# Patient Record
Sex: FEMALE | Race: WHITE | URBAN - METROPOLITAN AREA
[De-identification: names, ages, dates, MRNs, and addresses within clinical notes are randomized per-mention and may not be internally consistent; named-entity substitution may affect disease eponyms.]

---

## 2018-10-17 ENCOUNTER — TRANSFERRED RECORDS (OUTPATIENT)
Dept: HEALTH INFORMATION MANAGEMENT | Facility: CLINIC | Age: 68
End: 2018-10-17

## 2018-10-22 ENCOUNTER — THERAPY VISIT (OUTPATIENT)
Dept: PHYSICAL THERAPY | Facility: CLINIC | Age: 68
End: 2018-10-22
Payer: OTHER MISCELLANEOUS

## 2018-10-22 DIAGNOSIS — M25.512 ACUTE PAIN OF LEFT SHOULDER: Primary | ICD-10-CM

## 2018-10-22 PROCEDURE — 97161 PT EVAL LOW COMPLEX 20 MIN: CPT | Mod: GP | Performed by: PHYSICAL THERAPIST

## 2018-10-22 PROCEDURE — 97140 MANUAL THERAPY 1/> REGIONS: CPT | Mod: GP | Performed by: PHYSICAL THERAPIST

## 2018-10-22 NOTE — PROGRESS NOTES
[facility] Initial Evaluation  Subjective:  Patient is a 68 year old female presenting with rehab left shoulder hpi. The history is provided by the patient. No  was used.   Liudmila Bonds is a 68 year old female with a left shoulder condition.  Condition occurred with:  A fall.  Condition occurred: at work.  This is a new condition  She was going up a small step at work when she caught her R foot on step and fell forward and she hit her L shoulder into the wall and her L knee into the ground on 10/17/18.  She had instant pain in her L shoulder and knee.  The pain has improved since the fall.  Now the pain is more in the L chest into L medial/anterior upper arm and some into her L palm.  She needs to move her arm slow to avoid pain.  She gets pinching pain with raising L arm too high.  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  She will have pain if she tries to do too much lifting with L arm (holding cell phone in L hand increases pain).  Feels better with keeping arm at side.  .    Patient reports pain:  Anterior.  Radiates to:  Elbow, shoulder and upper arm.  Pain is described as aching and sharp and is constant (sharp is intermittent) and reported as 4/10 (worst 10/10).  Associated symptoms:  Loss of motion/stiffness, loss of strength, catching and edema. Pain is the same all the time.  Symptoms are exacerbated by using arm behind back, certain positions, carrying, lifting, using arm at shoulder level and using arm overhead and relieved by rest (limiting activity).  Since onset symptoms are gradually improving.        General health as reported by patient is good.  Pertinent medical history includes:  Fibromyalgia, osteoarthritis, incontinence, migraines/headaches and overweight.  Medical allergies: no.  Other surgeries include:  Orthopedic surgery (scope B knees).  Current medications:  Anti-inflammatory and muscle relaxants.  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  Patient is currently not working due to present treatment problem.  Primary job tasks include:  Driving and prolonged sitting.    Barriers include:  None as reported by the patient.    Red flags:  None as reported by the patient.                        Objective:  System    Physical Exam    General     ROS  Liudmila Bonds , : 1950, MRN: [id number]    Physical Therapy Objective Findings  Subjective information, goals, clinical impression, daily documentation and other information found in EPISODES tab.  Shoulder Objective Findings  Posture Comments: L shoulder rolled forward, keeps L hand tight to her body, mod forward head  Screens:                                                                                            Right                                                        Left                          Cervical (ROM, quadrant) Flex 40  Ext 33  R SB 14  R rot 50 L SB 10 (pain shooting into upper back)  L rot 50    Thoracic Mobility 50% ++ 50% +   TOS (Mohamud jeff, Torie, Magno)              Range of Motion:                                             Right AROM          Right PROM            Left AROM             Left PROM           Flexion 133 165 80 90   Abduction 100 165 50 90   External Rotation 77 in 0 degrees wnl in 90 degrees 52 in 0 degrees  45 in 45 degrees   Internal Rotation T12 in 0 degrees wnl in 90 degrees Back pocket in 0 degrees 45 in 45 degrees   Other:         Scapulothoracic Rhythm: Early upward rotation L>R    Manual Muscle Testing (graded 0-5, measured at 0 degrees unless otherwise noted):                                                                                                  Right                                                    Left                             Flexion 5 (+) 5 (++)   Abduction 5 (++) 4 (+++)   External Rotation (at 0) 5 (+) 5 (+)   Internal Rotation (at 0) 5(+) 5 (+)   Extension     Mid  Trap     Lower Trap     Other:     (+ mild pain, ++ moderate pain, +++ severe pain)    Special Tests:                                                                                             Right                                                    Left                             NEER'S - +   Del Rio/Matteo - +   Coracoid - +   Bursa - -   Valentines's -    Load and Shift      Relocation test     Sulcus test  -   Crossover -    OTHER: full can + +     Flexibility:                                                                                                 Right                                                    Left                             Pec Minor (supine) Mild tightness Mod tightness   Other     Other       Joint Play                                                                                              Right                                                  Left                            Glenohumeral normal normal   ACJ normal normal   SCJ normal normal     Palpation:  Tender B supraspinatus tendons, hypertonicity L pec minor, tender L long head biceps    Assessment/Plan:    Patient is a 68 year old female with left side shoulder complaints.    Patient has the following significant findings with corresponding treatment plan.                Diagnosis 1:  L shoulder pain consistent with tendonitis of supraspinatus with long head biceps and pec minor irritation as well    Pain -  hot/cold therapy, manual therapy, self management, education and home program  Decreased ROM/flexibility - manual therapy, therapeutic exercise and home program  Decreased strength - therapeutic exercise, therapeutic activities and home program  Decreased function - therapeutic activities and home program  Impaired posture - neuro re-education and home program    Therapy Evaluation Codes:   1) History comprised of:   Personal factors that impact the plan of care:      Age.    Comorbidity factors that impact the plan of care  are:      Fibromyalgia, Osteoarthritis and Overweight.     Medications impacting care: Anti-inflammatory and Muscle relaxant.  2) Examination of Body Systems comprised of:   Body structures and functions that impact the plan of care:      Shoulder.   Activity limitations that impact the plan of care are:      Bathing, Cooking, Driving, Dressing, Lifting, Working and Sleeping.  3) Clinical presentation characteristics are:   Stable/Uncomplicated.  4) Decision-Making    Low complexity using standardized patient assessment instrument and/or measureable assessment of functional outcome.  Cumulative Therapy Evaluation is: Low complexity.    Previous and current functional limitations:  (See Goal Flow Sheet for this information)    Short term and Long term goals: (See Goal Flow Sheet for this information)     Communication ability:  Patient appears to be able to clearly communicate and understand verbal and written communication and follow directions correctly.  Treatment Explanation - The following has been discussed with the patient:   RX ordered/plan of care  Anticipated outcomes  Possible risks and side effects  This patient would benefit from PT intervention to resume normal activities.   Rehab potential is good.    Frequency:  2 X week, once daily, 2 weeks, then 1x/week, 2 weeks  Duration:  for 4 weeks  Discharge Plan:  Achieve all LTG.  Independent in home treatment program.  Reach maximal therapeutic benefit.    Please refer to the daily flowsheet for treatment today, total treatment time and time spent performing 1:1 timed codes.     Matty Resendiz,PT, DPT, OCS

## 2018-10-22 NOTE — MR AVS SNAPSHOT
After Visit Summary   10/22/2018    Liudmila Bonds    MRN: [id number]           Patient Information     Date Of Birth          1950        Visit Information        Provider Department      10/22/2018 9:00 AM Matty Resendiz, PT [facility]        Today's Diagnoses     Acute pain of left shoulder    -  1       Follow-ups after your visit        Your next 10 appointments already scheduled     Oct 25, 2018  9:00 AM CDT   MARSHA Extremity with Paty Humphrey, PT   [facility] ([facility]  )    [address]  [facility] [address]   [phone]            Oct 29, 2018  9:00 AM CDT   MARSHA Extremity with Matty Resendiz, PT   [facility] ([facility]  )    [address]  [facility] [address]   [phone]            Nov 02, 2018  9:00 AM CDT   MARSHA Extremity with Matty Resendiz, PT   [facility]  )    [address]  [facility] [address]   [phone]              Who to contact     If you have questions or need follow up information about today's clinic visit or your schedule please contact [facility]ERAP[facility] directly at [phone].  Normal or non-critical lab and imaging results will be communicated to you by MyChart, letter or phone within 4 business days after the clinic has received the results. If you do not hear from us within 7 days, please contact the clinic through MyChart or phone. If you have a critical or abnormal lab result, we will notify you by phone as soon as possible.  Submit refill requests through [facility] or call your pharmacy and they will forward the refill request to us. Please allow 3 business days for your refill to be completed.          Additional Information About Your  "Visit        [facility] Information     SNAPP' lets you send messages to your doctor, view your test results, renew your prescriptions, schedule appointments and more. To sign up, go to [email or web address] . Click on \"Log in\" on the left side of the screen, which will take you to the Welcome page. Then click on \"Sign up Now\" on the right side of the page.     You will be asked to enter the access code listed below, as well as some personal information. Please follow the directions to create your username and password.     Your access code is: [email or web address]  Expires: 2019  1:32 PM     Your access code will  in 90 days. If you need help or a new code, please call your [facility] clinic or [phone].        Care EveryWhere ID     This is your Care EveryWhere ID. This could be used by other organizations to access your [facility] medical records  [id number]         Blood Pressure from Last 3 Encounters:   08 140/85    Weight from Last 3 Encounters:   No data found for Wt              We Performed the Following     HC PT EVAL, LOW COMPLEXITY     MARSHA INITIAL EVAL REPORT     MANUAL THER TECH,1+REGIONS,EA 15 MIN        Primary Care Provider Office Phone # Fax #    [facility] [phone]       [facility] [address] [place] [address]        Equal Access to Services     BELL TOMLINSON : Hadii aad ku hadasho Soomaali, waaxda luqadaha, qaybta kaalmada burke, olman haywood . So [facility] [phone].    ATENCIÓN: Si habla español, tiene a randhawa disposición servicios gratuitos de asistencia lingüística. Mich al [phone].    We comply with applicable federal civil rights laws and [place] laws. We do not discriminate on the basis of race, color, national origin, age, disability, sex, sexual orientation, or gender identity.            Thank you!     Thank you for choosing [facility]  for your " care. Our goal is always to provide you with excellent care. Hearing back from our patients is one way we can continue to improve our services. Please take a few minutes to complete the written survey that you may receive in the mail after your visit with us. Thank you!             Your Updated Medication List - Protect others around you: Learn how to safely use, store and throw away your medicines at www.disposemymeds.org.          This list is accurate as of 10/22/18  1:32 PM.  Always use your most recent med list.                   Brand Name Dispense Instructions for use Diagnosis    doxycycline 100 MG capsule    VIBRAMYCIN    20    Take 1  tablet twice a day for 10 days    Acute sinusitis, unspecified, Bronchitis, not specified as acute or chronic       ROBITUSSIN A-C  MG/5ML Syrp   Generic drug:  CODEINE-GUAIFENESIN     4 OZ    ONE TO TWO TEASPOONS EVER 4 HOURS, AS NEEDED FOR COUGH    Bronchitis, not specified as acute or chronic

## 2018-10-22 NOTE — LETTER
[facility] PHYSICAL [facility]  [address]  [facility] [id number] [phone]    2018    Re: Liudmila Bonds   :   1950  MRN:  [id number]   REFERRING PHYSICIAN:   Jessi Hernadez     [facility]  Date of Initial Evaluation:  10/22/18  Visits:  Rxs Used: 1  Reason for Referral:  Acute pain of left shoulder    EVALUATION SUMMARY    [facility] Initial Evaluation  Subjective:  Patient is a 68 year old female presenting with rehab left shoulder hpi. The history is provided by the patient. No  was used.   Liudmila Bonds is a 68 year old female with a left shoulder condition.  Condition occurred with:  A fall.  Condition occurred: at work.  This is a new condition  She was going up a small step at work when she caught her R foot on step and fell forward and she hit her L shoulder into the wall and her L knee into the ground on 10/17/18.  She had instant pain in her L shoulder and knee.  The pain has improved since the fall.  Now the pain is more in the L chest into L medial/anterior upper arm and some into her L palm.  She needs to move her arm slow to avoid pain.  She gets pinching pain with raising L arm too high.  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  She will have pain if she tries to do too much lifting with L arm (holding cell phone in L hand increases pain).  Feels better with keeping arm at side.  .    Patient reports pain:  Anterior.  Radiates to:  Elbow, shoulder and upper arm.  Pain is described as aching and sharp and is constant (sharp is intermittent) and reported as 4/10 (worst 10/10).  Associated symptoms:  Loss of motion/stiffness, loss of strength, catching and edema. Pain is the same all the time.  Symptoms are exacerbated by using arm behind back, certain positions, carrying, lifting, using arm at shoulder level and  using arm overhead and relieved by rest (limiting activity).  Since onset symptoms are gradually improving.        General health as reported by patient is good.  Pertinent medical history includes:  Fibromyalgia, osteoarthritis, incontinence, migraines/headaches and overweight.  Medical allergies: no.  Other surgeries include:  Orthopedic surgery (scope B knees).  Current medications:  Anti-inflammatory and muscle relaxants.  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  Patient is currently not working due to present treatment problem.  Primary job tasks include:  Driving and prolonged sitting.    Barriers include:  None as reported by the patient.    Red flags:  None as reported by the patient.                        Objective:  Liudmila Bonds , : 1950, MRN: [id number]    Physical Therapy Objective Findings  Subjective information, goals, clinical impression, daily documentation and other information found in EPISODES tab.  Shoulder Objective Findings  Posture Comments: L shoulder rolled forward, keeps L hand tight to her body, mod forward head  Screens:                                                                                            Right                                                        Left                          Cervical (ROM, quadrant) Flex 40  Ext 33  R SB 14  R rot 50 L SB 10 (pain shooting into upper back)  L rot 50    Thoracic Mobility 50% ++ 50% +   TOS (Mohamud jeff, Torie, Magno)              Range of Motion:                                             Right AROM          Right PROM            Left AROM             Left PROM           Flexion 133 165 80 90   Abduction 100 165 50 90   External Rotation 77 in 0 degrees wnl in 90 degrees 52 in 0 degrees  45 in 45 degrees   Internal Rotation T12 in 0 degrees wnl in 90 degrees Back pocket in 0 degrees 45 in 45 degrees   Other:         Scapulothoracic Rhythm: Early upward rotation  L>R    Manual Muscle Testing (graded 0-5, measured at 0 degrees unless otherwise noted):                                                                                                  Right                                                    Left                             Flexion 5 (+) 5 (++)   Abduction 5 (++) 4 (+++)   External Rotation (at 0) 5 (+) 5 (+)   Internal Rotation (at 0) 5(+) 5 (+)   Extension     Mid Trap     Lower Trap     Other:     (+ mild pain, ++ moderate pain, +++ severe pain)    Special Tests:                                                                                             Right                                                    Left                             NEER'S - +   Del Rio/Matteo - +   Coracoid - +   Bursa - -   Laurens's -    Load and Shift      Relocation test     Sulcus test  -   Crossover -    OTHER: full can + +   Flexibility:                                                                                                 Right                                                    Left                             Pec Minor (supine) Mild tightness Mod tightness   Other     Other       Joint Play                                                                                              Right                                                  Left                            Glenohumeral normal normal   ACJ normal normal   SCJ normal normal     Palpation:  Tender B supraspinatus tendons, hypertonicity L pec minor, tender L long head biceps    Assessment/Plan:    Patient is a 68 year old female with left side shoulder complaints.    Patient has the following significant findings with corresponding treatment plan.                Diagnosis 1:  L shoulder pain consistent with tendonitis of supraspinatus with long head biceps and pec minor irritation as well  Pain -  hot/cold therapy, manual therapy, self management, education and home program  Decreased ROM/flexibility - manual  therapy, therapeutic exercise and home program  Decreased strength - therapeutic exercise, therapeutic activities and home program  Decreased function - therapeutic activities and home program  Impaired posture - neuro re-education and home program    Therapy Evaluation Codes:   1) History comprised of:   Personal factors that impact the plan of care:      Age.    Comorbidity factors that impact the plan of care are:      Fibromyalgia, Osteoarthritis and Overweight.     Medications impacting care: Anti-inflammatory and Muscle relaxant.  2) Examination of Body Systems comprised of:   Body structures and functions that impact the plan of care:      Shoulder.   Activity limitations that impact the plan of care are:      Bathing, Cooking, Driving, Dressing, Lifting, Working and Sleeping.  3) Clinical presentation characteristics are:   Stable/Uncomplicated.  4) Decision-Making    Low complexity using standardized patient assessment instrument and/or measureable assessment of functional outcome.  Cumulative Therapy Evaluation is: Low complexity.    Previous and current functional limitations:  (See Goal Flow Sheet for this information)    Short term and Long term goals: (See Goal Flow Sheet for this information)     Communication ability:  Patient appears to be able to clearly communicate and understand verbal and written communication and follow directions correctly.  Treatment Explanation - The following has been discussed with the patient:   RX ordered/plan of care  Anticipated outcomes  Possible risks and side effects  This patient would benefit from PT intervention to resume normal activities.   Rehab potential is good.    Frequency:  2 X week, once daily, 2 weeks, then 1x/week, 2 weeks  Duration:  for 4 weeks  Discharge Plan:  Achieve all LTG.  Independent in home treatment program.  Reach maximal therapeutic benefit.    Thank you for your referral.    INQUIRIES  Therapist: Matty Resendiz,PT, DPT,  [facility]      [facility]  [address]  [facility] [address]  Phone: [phone]  Fax: [phone]

## 2018-10-25 ENCOUNTER — THERAPY VISIT (OUTPATIENT)
Dept: PHYSICAL THERAPY | Facility: CLINIC | Age: 68
End: 2018-10-25
Payer: OTHER MISCELLANEOUS

## 2018-10-25 DIAGNOSIS — M25.512 ACUTE PAIN OF LEFT SHOULDER: ICD-10-CM

## 2018-10-25 PROCEDURE — 97110 THERAPEUTIC EXERCISES: CPT | Mod: GP | Performed by: PHYSICAL THERAPIST

## 2018-10-25 PROCEDURE — 97140 MANUAL THERAPY 1/> REGIONS: CPT | Mod: GP | Performed by: PHYSICAL THERAPIST

## 2018-10-29 ENCOUNTER — THERAPY VISIT (OUTPATIENT)
Dept: PHYSICAL THERAPY | Facility: CLINIC | Age: 68
End: 2018-10-29
Payer: OTHER MISCELLANEOUS

## 2018-10-29 DIAGNOSIS — M25.512 ACUTE PAIN OF LEFT SHOULDER: ICD-10-CM

## 2018-10-29 PROCEDURE — 97110 THERAPEUTIC EXERCISES: CPT | Mod: GP | Performed by: PHYSICAL THERAPIST

## 2018-10-29 PROCEDURE — 97112 NEUROMUSCULAR REEDUCATION: CPT | Mod: GP | Performed by: PHYSICAL THERAPIST

## 2018-10-29 PROCEDURE — 97140 MANUAL THERAPY 1/> REGIONS: CPT | Mod: GP | Performed by: PHYSICAL THERAPIST

## 2018-11-06 ENCOUNTER — THERAPY VISIT (OUTPATIENT)
Dept: PHYSICAL THERAPY | Facility: CLINIC | Age: 68
End: 2018-11-06
Payer: OTHER MISCELLANEOUS

## 2018-11-06 DIAGNOSIS — M25.512 ACUTE PAIN OF LEFT SHOULDER: ICD-10-CM

## 2018-11-06 PROCEDURE — 97112 NEUROMUSCULAR REEDUCATION: CPT | Mod: GP | Performed by: PHYSICAL THERAPIST

## 2018-11-06 PROCEDURE — 97140 MANUAL THERAPY 1/> REGIONS: CPT | Mod: GP | Performed by: PHYSICAL THERAPIST

## 2018-11-06 PROCEDURE — 97110 THERAPEUTIC EXERCISES: CPT | Mod: GP | Performed by: PHYSICAL THERAPIST

## 2018-11-13 ENCOUNTER — THERAPY VISIT (OUTPATIENT)
Dept: PHYSICAL THERAPY | Facility: CLINIC | Age: 68
End: 2018-11-13
Payer: OTHER MISCELLANEOUS

## 2018-11-13 DIAGNOSIS — M25.512 ACUTE PAIN OF LEFT SHOULDER: ICD-10-CM

## 2018-11-13 PROCEDURE — 97112 NEUROMUSCULAR REEDUCATION: CPT | Mod: GP | Performed by: PHYSICAL THERAPIST

## 2018-11-13 PROCEDURE — 97140 MANUAL THERAPY 1/> REGIONS: CPT | Mod: GP | Performed by: PHYSICAL THERAPIST

## 2018-11-13 PROCEDURE — 97110 THERAPEUTIC EXERCISES: CPT | Mod: GP | Performed by: PHYSICAL THERAPIST

## 2018-11-13 NOTE — PROGRESS NOTES
Subjective:  HPI                    Objective:  System    Physical Exam    General     ROS    Assessment/Plan:    PROGRESS  REPORT    Progress reporting period is from 10/22/18 to 11/13/18.       SUBJECTIVE  Subjective changes noted by patient:  doing well, she will have some pain with reaching into abduction, she has  returned to work full time, shoulder not limiting her sleep, she still has some pain with leaning on L arm, she hasn't been very good with the exercises in last week with returning to work full time    Current Pain level: 0/10 (worst 3-4/10).     Previous pain level was  NA Initial Pain level: 4/10.   Changes in function:  Yes (See Goal flowsheet attached for changes in current functional level)  Adverse reaction to treatment or activity: activity - pushing up with L arm, lifting too much with L arm    OBJECTIVE  Changes noted in objective findings:    Objective: AROM: flex 135, abd 100 (+), ER(0) 80, IR(0) T9, + full can L, + neers, - verma, + corocoid, - cross arm, + bursa, tender at L supraspinatus tendon, MMT: shoulder abd 4-/5 (++),  ER 4+/5 (+), IR 4+/5, biceps 4+/5 (+), posture: mod forward head, increased thoracic kyphosis, normal tone L pec minor     ASSESSMENT/PLAN  Updated problem list and treatment plan: Diagnosis 1:   L shoulder pain consistent with tendonitis of supraspinatus with long head biceps and pec minor irritation as well    Pain -  hot/cold therapy, manual therapy, self management, education and home program  Decreased ROM/flexibility - manual therapy, therapeutic exercise, therapeutic activity and home program  Decreased strength - therapeutic exercise, therapeutic activities and home program  Decreased function - therapeutic activities and home program  Impaired posture - neuro re-education, therapeutic activities and home program  STG/LTGs have been met or progress has been made towards goals:  Yes (See Goal flow sheet completed today.)  Assessment of Progress: The  patient's condition is improving.  Self Management Plans:  Patient has been instructed in a home treatment program.  I have re-evaluated this patient and find that the nature, scope, duration and intensity of the therapy is appropriate for the medical condition of the patient.  Liudmila continues to require the following intervention to meet STG and LTG's:  PT    Recommendations:  This patient would benefit from continued therapy.     Frequency:  1 X week, once daily  Duration:  for 4 weeks        Please refer to the daily flowsheet for treatment today, total treatment time and time spent performing 1:1 timed codes.    Matty Resendiz,PT, DPT, OCS

## 2018-11-13 NOTE — LETTER
[facility]  [address]  [facility] [id number] [phone]    2018    Re: Liudmila Bonds   :   1950  MRN:  [id number]   REFERRING PHYSICIAN:   Jessi Hernadez    [facility]  Date of Initial Evaluation:  10/22/18  Visits:  Rxs Used: 5  Reason for Referral:  Acute pain of left shoulder    PROGRESS  REPORT  Progress reporting period is from 10/22/18 to 18.       SUBJECTIVE  Subjective changes noted by patient:  doing well, she will have some pain with reaching into abduction, she has  returned to work full time, shoulder not limiting her sleep, she still has some pain with leaning on L arm, she hasn't been very good with the exercises in last week with returning to work full time    Current Pain level: 0/10 (worst 3-4/10).     Previous pain level was  NA Initial Pain level: 4/10.   Changes in function:  Yes (See Goal flowsheet attached for changes in current functional level)  Adverse reaction to treatment or activity: activity - pushing up with L arm, lifting too much with L arm    OBJECTIVE  Changes noted in objective findings:    Objective: AROM: flex 135, abd 100 (+), ER(0) 80, IR(0) T9, + full can L, + neers, - verma, + corocoid, - cross arm, + bursa, tender at L supraspinatus tendon, MMT: shoulder abd 4-/5 (++),  ER 4+/5 (+), IR 4+/5, biceps 4+/5 (+), posture: mod forward head, increased thoracic kyphosis, normal tone L pec minor     ASSESSMENT/PLAN  Updated problem list and treatment plan: Diagnosis 1:   L shoulder pain consistent with tendonitis of supraspinatus with long head biceps and pec minor irritation as well    Pain -  hot/cold therapy, manual therapy, self management, education and home program  Decreased ROM/flexibility - manual therapy, therapeutic exercise, therapeutic activity and home program  Decreased strength - therapeutic exercise,  therapeutic activities and home program  Decreased function - therapeutic activities and home program  Impaired posture - neuro re-education, therapeutic activities and home program  STG/LTGs have been met or progress has been made towards goals:  Yes (See Goal flow sheet completed today.)  Assessment of Progress: The patient's condition is improving.  Self Management Plans:  Patient has been instructed in a home treatment program.    I have re-evaluated this patient and find that the nature, scope, duration and intensity of the therapy is appropriate for the medical condition of the patient.  Liudmila continues to require the following intervention to meet STG and LTG's:  PT    Recommendations:  This patient would benefit from continued therapy.     Frequency:  1 X week, once daily  Duration:  for 4 weeks    Thank you for your referral.    INQUIRIES  Therapist:  Matty Resendiz,PT, DPT, \[facility]\""      [facility]  [address] [place] [address]. [address]  [facility] [address]  Phone: [phone]  Fax: [phone]

## 2018-11-13 NOTE — MR AVS SNAPSHOT
"              After Visit Summary   2018    Liudmila Bonds    MRN: [id number]           Patient Information     Date Of Birth          1950        Visit Information        Provider Department      2018 10:00 AM Matty Resendiz PT [facility]        Today's Diagnoses     Acute pain of left shoulder           Follow-ups after your visit        Who to contact     If you have questions or need follow up information about today's clinic visit or your schedule please contact [facility] ATHLETIC [facility] directly at [phone].  Normal or non-critical lab and imaging results will be communicated to you by [employer], letter or phone within 4 business days after the clinic has received the results. If you do not hear from us within 7 days, please contact the clinic through [employer] or phone. If you have a critical or abnormal lab result, we will notify you by phone as soon as possible.  Submit refill requests through [employer] or call your pharmacy and they will forward the refill request to us. Please allow 3 business days for your refill to be completed.          Additional Information About Your Visit        MyChart Information     [employer] lets you send messages to your doctor, view your test results, renew your prescriptions, schedule appointments and more. To sign up, go to [email or web address] . Click on \"Log in\" on the left side of the screen, which will take you to the Welcome page. Then click on \"Sign up Now\" on the right side of the page.     You will be asked to enter the access code listed below, as well as some personal information. Please follow the directions to create your username and password.     Your access code is: [email or web address]  Expires: 2019 12:32 PM     Your access code will  in 90 days. If you need help or a new code, please call your [place] clinic or [phone].        Care EveryWhere " ID     This is your Care EveryWhere ID. This could be used by other organizations to access your [place] medical records  [id number]         Blood Pressure from Last 3 Encounters:   11/20/08 140/85    Weight from Last 3 Encounters:   No data found for Wt              We Performed the Following     MANUAL THER TECH,1+REGIONS,EA 15 MIN     NEUROMUSCULAR RE-EDUCATION     THERAPEUTIC EXERCISES        Primary Care Provider Office Phone # Fax #    [facility] [phone]       [facility] [address]  [facility] [address]        Equal Access to Services     [facility]: Hadii aad ku hadasho Soomaali, waaxda luqadaha, qaybta kaalmada adeegyada, waxay idiin hayaan poncho haywood . So [facility] [phone].    ATENCIÓN: Si habla español, tiene a randhawa disposición servicios gratuitos de asistencia lingüística. [facility] [phone].    We comply with applicable federal civil rights laws and [place] laws. We do not discriminate on the basis of race, color, national origin, age, disability, sex, sexual orientation, or gender identity.            Thank you!     Thank you for choosing [facility]  for your care. Our goal is always to provide you with excellent care. Hearing back from our patients is one way we can continue to improve our services. Please take a few minutes to complete the written survey that you may receive in the mail after your visit with us. Thank you!             Your Updated Medication List - Protect others around you: Learn how to safely use, store and throw away your medicines at www.disposemymeds.org.          This list is accurate as of 11/13/18 10:43 AM.  Always use your most recent med list.                   Brand Name Dispense Instructions for use Diagnosis    doxycycline 100 MG capsule    VIBRAMYCIN    20    Take 1  tablet twice a day for 10 days    Acute sinusitis, unspecified, Bronchitis, not specified as  acute or chronic       ROBITUSSIN A-C  MG/5ML Syrp   Generic drug:  CODEINE-GUAIFENESIN     4 OZ    ONE TO TWO TEASPOONS EVER 4 HOURS, AS NEEDED FOR COUGH    Bronchitis, not specified as acute or chronic

## 2018-11-15 ENCOUNTER — TELEPHONE (OUTPATIENT)
Dept: PHYSICAL THERAPY | Facility: CLINIC | Age: 68
End: 2018-11-15

## 2018-11-15 DIAGNOSIS — M25.512 ACUTE PAIN OF LEFT SHOULDER: ICD-10-CM

## 2018-11-15 NOTE — TELEPHONE ENCOUNTER
Called Gayatri Her at work comp on 11/15/18 at 5:40pm.  Requested 4 additional visits to the 6 that were authorized for a total of 10 visits.  She was informed that we have an updated order to support said request.  She was instructed to call if she had questions.    Matty Resendiz,PT, DPT, OCS

## 2018-11-19 NOTE — TELEPHONE ENCOUNTER
Gayatri from work comp called back.  Requested a faxed copy of request for further visits.  She also mentions that she would like to wait and see what the MD has to say at the next appt.  We will fax over the progress note and new MD order that we received (11/19/18).    Matty Resendiz,PT, DPT, OCS

## 2019-02-01 ENCOUNTER — THERAPY VISIT (OUTPATIENT)
Dept: PHYSICAL THERAPY | Facility: CLINIC | Age: 69
End: 2019-02-01
Payer: OTHER MISCELLANEOUS

## 2019-02-01 DIAGNOSIS — M25.512 ACUTE PAIN OF LEFT SHOULDER: ICD-10-CM

## 2019-02-01 PROCEDURE — 97164 PT RE-EVAL EST PLAN CARE: CPT | Mod: GP | Performed by: PHYSICAL THERAPIST

## 2019-02-01 PROCEDURE — 97140 MANUAL THERAPY 1/> REGIONS: CPT | Mod: GP | Performed by: PHYSICAL THERAPIST

## 2019-02-01 PROCEDURE — 97110 THERAPEUTIC EXERCISES: CPT | Mod: GP | Performed by: PHYSICAL THERAPIST

## 2019-02-01 NOTE — PROGRESS NOTES
Subjective:  HPI                    Objective:  System    Physical Exam    General     ROS    Assessment/Plan:    PROGRESS  REPORT    Progress reporting period is from 11/13/18 to 2/1/19.       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     Previous pain level was  0-4/10 Initial Pain level: 4/10.   Changes in function:  Yes (See Goal flowsheet attached for changes in current functional level)  Adverse reaction to treatment or activity: activity - using her L arm too much    OBJECTIVE  Changes noted in objective findings:    Objective: AROM: flex 125 (+), abd 90 (+), ER(0) 69, IR(0) T10, + full can L, + speeds, + neers, - verma, + corocoid, - cross arm, tender at L supraspinatus tendon and long head biceps, MMT: shoulder abd 4/5 (++),  ER 4/5 (+), IR %/5, biceps 4+/5 (+), + ulnar n tension, + median n tension, hypertonicity L pec minor, cervical distraction: increases pain in arm, loss of L shoulder AROM, seated cervical retraction: improving cervical retraction ROM, improving shoulder AROM flex,  posture: mod forward head, increased thoracic kyphosis    ASSESSMENT/PLAN  Updated problem list and treatment plan: Diagnosis 1: L shoulder pain consistent with tendonitis of supraspinatus with long head biceps and pec minor irritation as well, with lower cervical disc irritation    Pain -  hot/cold therapy, manual therapy, self management, education, directional preference exercise and home program  Decreased ROM/flexibility - manual  therapy, therapeutic exercise, therapeutic activity and home program  Decreased strength - therapeutic exercise, therapeutic activities and home program  Decreased function - therapeutic activities and home program  Impaired posture - neuro re-education, therapeutic activities and home program  STG/LTGs have been met or progress has been made towards goals:  Yes (See Goal flow sheet completed today.)  Assessment of Progress: The patient's condition has exacerbated.  Self Management Plans:  Patient has been instructed in a home treatment program.  I have re-evaluated this patient and find that the nature, scope, duration and intensity of the therapy is appropriate for the medical condition of the patient.  Liudmila continues to require the following intervention to meet STG and LTG's:  PT    Recommendations:  This patient would benefit from continued therapy.     Frequency:  1 X week, once daily  Duration:  for 6 weeks        Please refer to the daily flowsheet for treatment today, total treatment time and time spent performing 1:1 timed codes.      Matty Resendiz,PT, DPT, OCS

## 2019-02-05 ENCOUNTER — THERAPY VISIT (OUTPATIENT)
Dept: PHYSICAL THERAPY | Facility: CLINIC | Age: 69
End: 2019-02-05
Payer: OTHER MISCELLANEOUS

## 2019-02-05 DIAGNOSIS — M25.512 ACUTE PAIN OF LEFT SHOULDER: ICD-10-CM

## 2019-02-05 PROCEDURE — 97140 MANUAL THERAPY 1/> REGIONS: CPT | Mod: GP | Performed by: PHYSICAL THERAPIST

## 2019-02-05 PROCEDURE — 97112 NEUROMUSCULAR REEDUCATION: CPT | Mod: GP | Performed by: PHYSICAL THERAPIST

## 2019-02-05 PROCEDURE — 97110 THERAPEUTIC EXERCISES: CPT | Mod: GP | Performed by: PHYSICAL THERAPIST

## 2019-04-29 PROBLEM — M25.512 ACUTE PAIN OF LEFT SHOULDER: Status: RESOLVED | Noted: 2018-10-22 | Resolved: 2019-04-29

## 2019-04-29 NOTE — PROGRESS NOTES
Discharge Note    Progress reporting period is from last progress note on 02/01/19 to Feb 5, 2019.    Liudmila failed to follow up and current status is unknown.  Please see information below for last relevant information on current status.  Patient seen for 7 visits.    SUBJECTIVE  Subjective changes noted by patient:  Pt presents with ongoing complaint of pinching sensation in the shoulder anterio, radial forearm and in the thumb on the L side.  Pain today is rated at 3/10 level. The pain is not as constant and nagging   .  Current pain level is 3/10.     Previous pain level was  4/10.   Changes in function:  Yes (See Goal flowsheet attached for changes in current functional level)  Adverse reaction to treatment or activity: None    OBJECTIVE  Changes noted in objective findings: AROM flexion to 110, scaptoin to 90, ER tpo 80, IR to T7. States she is sore, just finished vaccuming.      ASSESSMENT/PLAN  Diagnosis: L shoulder pain   Updated problem list and treatment plan:   Pain - HEP  Decreased ROM/flexibility - HEP  Decreased function - HEP  STG/LTGs have been met or progress has been made towards goals:  Yes, please see goal flowsheet for most current information  Assessment of Progress: current status is unknown.    Last current status:     Self Management Plans:  HEP  I have re-evaluated this patient and find that the nature, scope, duration and intensity of the therapy is appropriate for the medical condition of the patient.  Liudmila continues to require the following intervention to meet STG and LTG's:  HEP.    Recommendations:  Discharge with current home program.  Patient to follow up with MD as needed.    Please refer to the daily flowsheet for treatment today, total treatment time and time spent performing 1:1 timed codes.    Matty Resendiz,PT, DPT, OCS